# Patient Record
Sex: MALE
[De-identification: names, ages, dates, MRNs, and addresses within clinical notes are randomized per-mention and may not be internally consistent; named-entity substitution may affect disease eponyms.]

---

## 2019-06-27 ENCOUNTER — HOSPITAL ENCOUNTER (OUTPATIENT)
Dept: HOSPITAL 92 - BICMAMMO | Age: 64
Discharge: HOME | End: 2019-06-27
Attending: NURSE PRACTITIONER
Payer: COMMERCIAL

## 2019-06-27 DIAGNOSIS — N63.20: Primary | ICD-10-CM

## 2019-06-27 PROCEDURE — G0279 TOMOSYNTHESIS, MAMMO: HCPCS

## 2019-06-27 PROCEDURE — 77066 DX MAMMO INCL CAD BI: CPT

## 2019-06-27 NOTE — MMO
Bilateral MAMMO Bilat Diag [facility].

 

CLINICAL HISTORY:

Patient is 63 years old and is seen for diagnostic exam and lump or thickening

in the left breast. The patient has no family history of breast cancer.  The

patient has no personal history of cancer.

 

VIEWS:

The views performed were:  bilateral craniocaudal with tomosynthesis; bilateral

mediolateral oblique with tomosynthesis; and bilateral mediolateral with

tomosynthesis.

 

MAMMOGRAM FINDINGS:

The breasts are almost entirely fat.

 

There is a small area of fibroglandular tissue in the left retroaerolar region

consistent with gynecomastia.

 

There are no suspicious masses, suspicious calcifications, or new areas of

architectural distortion.

 

IMPRESSION:

THERE IS NO MAMMOGRAPHIC EVIDENCE OF MALIGNANCY.

 

THE RESULTS OF THIS EXAM WERE SENT TO THE PATIENT.

 

ACR BI-RADS Category 2 - Benign finding

 

MAMMOGRAPHY NOTE:

 1. A negative mammogram report should not delay a biopsy if a dominant of

 clinically suspicious mass is present.

 2. Approximately 10% to 15% of breast cancers are not detected by

 mammography.

 3. Adenosis and dense breasts may obscure an underlying neoplasm.